# Patient Record
Sex: FEMALE | Race: BLACK OR AFRICAN AMERICAN
[De-identification: names, ages, dates, MRNs, and addresses within clinical notes are randomized per-mention and may not be internally consistent; named-entity substitution may affect disease eponyms.]

---

## 2017-02-18 ENCOUNTER — HOSPITAL ENCOUNTER (EMERGENCY)
Dept: HOSPITAL 10 - E/R | Age: 32
Discharge: HOME | End: 2017-02-18
Payer: SELF-PAY

## 2017-02-18 VITALS — HEART RATE: 81 BPM | DIASTOLIC BLOOD PRESSURE: 64 MMHG | RESPIRATION RATE: 20 BRPM | SYSTOLIC BLOOD PRESSURE: 119 MMHG

## 2017-02-18 VITALS
HEIGHT: 71 IN | BODY MASS INDEX: 24.23 KG/M2 | WEIGHT: 173.06 LBS | BODY MASS INDEX: 24.23 KG/M2 | HEIGHT: 71 IN | WEIGHT: 173.06 LBS

## 2017-02-18 DIAGNOSIS — D57.00: Primary | ICD-10-CM

## 2017-02-18 LAB — URINE BLOOD (DIP) POC: (no result)

## 2017-02-18 PROCEDURE — 99283 EMERGENCY DEPT VISIT LOW MDM: CPT

## 2017-02-18 PROCEDURE — 81003 URINALYSIS AUTO W/O SCOPE: CPT

## 2017-02-18 NOTE — ERD
ER Documentation


Chief Complaint


Date/Time


DATE: 2/18/17 


TIME: 22:20


Chief Complaint


Sickle cell disease. Low back pain. No CP.





HPI


Patient is a 31-year-old female with sickle cell disease who presents with a 

sickle cell pain crisis.  The patient says that she has had lower back pain for 

the past 3 hours.  She has had no treatment as of yet.  She has no chest pain 

or fevers.  These pains are consistent with her previous sickle cell pain.  

Upon review of old medical records this is the patient's seventh visit to the 

ER since 2015.  Review of the emergency department information exchange shows 

visits to our emergency department only.  She does not currently have a primary 

doctor.





ROS


All systems reviewed and are negative except as per history of present illness.





Medications


Home Meds


Active Scripts


Hydrocodone/Acetaminophen (Norco  Tablet) 1 Each Tablet, 1 TAB PO Q6H Y 

for PAIN, #7 TAB


   Prov:JAILENE ALEX MD         2/18/17


Ondansetron Hcl* (Zofran* ODT) 4 mg -ODT Tab.disper, 4 MG PO Q6 Y for NAUSEA AND

/OR VOMITING, #30 TAB


   Prov:JAILENE ALEX MD         2/24/16


Hydrocodone Bit-Acetaminophen* (Norco*)  Mg Tablet, 1 TAB PO Q6 Y for PAIN

, #12 TAB


   Prov:JAILENE ALEX MD         2/24/16





Allergies


Allergies:  


Coded Allergies:  


     morphine (Verified  Allergy, Unknown, 2/19/16)





PMhx/Soc


Medical and Surgical Hx:  pt denies Surgical Hx


History of Surgery:  No


Anesthesia Reaction:  No


Hx Neurological Disorder:  No


Hx Respiratory Disorders:  No


Hx Cardiac Disorders:  No


Hx Psychiatric Problems:  No


Hx Miscellaneous Medical Probl:  Yes (SICKLE CELL)


Hx Alcohol Use:  No


Hx Substance Use:  No


Hx Tobacco Use:  No


Smoking Status:  Unknown if ever smoked





FmHx


Family History:  No diabetes





Physical Exam


Vitals





Vital Signs








  Date Time  Temp Pulse Resp B/P Pulse Ox O2 Delivery O2 Flow Rate FiO2


 


2/18/17 21:09  81 20 119/64 98 Room Air  


 


2/18/17 19:56 98.8 89 22 135/89 98   








Physical Exam


Const: Mild distress secondary to pain


Head:   Atraumatic 


Eyes:    Normal Conjunctiva


ENT:    Normal External Ears, Nose and Mouth.


Neck:               Full range of motion..~ No meningismus.


Resp:    Clear to auscultation bilaterally


Cardio:    Regular rate and rhythm, no murmurs


Abd:    Soft, non tender, non distended. Normal bowel sounds


Skin:    No petechiae or rashes


Back:   Lower back pain with palpation bilaterally


Ext:    No cyanosis, or edema


Neur:    Awake and alert


Psych:    Normal Mood and Affect


Results 24 hrs





 Laboratory Tests








Test


  2/18/17


20:46


 


Bedside Urine Blood 2+ 


 


Bedside Urine Glucose (UA) Negative 


 


Bedside Urine Ketones (LAB) Negative 


 


Bedside Urine Leukocyte


Esterase (L Negative 


 


 


Bedside Urine Nitrite (LAB) Negative 


 


Bedside Urine Protein (LAB) Negative 


 


Bedside Urine pH (LAB) 6.0 








 Current Medications








 Medications


  (Trade)  Dose


 Ordered  Sig/King


 Route


 PRN Reason  Start Time


 Stop Time Status Last Admin


Dose Admin


 


 Acetaminophen/


 Hydrocodone Bitart


  (Norco (10/325))  1 tab  ONCE  ONCE


 PO


   2/18/17 20:30


 2/18/17 20:31 DC 2/18/17 20:39


 


 


 Ondansetron HCl


  (Zofran Odt)  4 mg  ONCE  STAT


 ODT


   2/18/17 20:28


 2/18/17 20:29 DC 2/18/17 20:50


 











Procedures/MDM


Patient is a 31-year-old female presents with acute sickle cell pain crisis.  

She has back pain.  Her pregnancy test is negative.  At this point I doubt 

cauda equina syndrome, epidural abscess, or epidural hematoma.  I believe 

outpatient management is appropriate.  She was given Norco and Zofran.  She 

will be given a short course of Reseda but she will need to follow-up with a 

pain management doctor as well.  I will also give her information for the local 

clinics.  I will give her information for Dr. Doyle for pain management.  She 

can return sooner for any worsening symptoms.





Departure


Diagnosis:  


 Primary Impression:  


 Sickle cell pain crisis


Condition:  Fair


Patient Instructions:  Sickle Cell Pain Crisis


Referrals:  


KRISTAN DOYLE








[facility]


YOU HAVE RECEIVED A MEDICAL SCREENING EXAM AND THE RESULTS INDICATE THAT YOU DO 

NOT HAVE A CONDITION THAT REQUIRES URGENT TREATMENT IN THE EMERGENCY DEPARTMENT.





FURTHER EVALUATION AND TREATMENT OF YOUR CONDITION CAN WAIT UNTIL YOU ARE SEEN 

IN YOUR DOCTORS OFFICE WITHIN THE NEXT 1-2 DAYS. IT IS YOUR RESPONSIBILITY TO 

MAKE AN APPOINTMENT FOR FOLOW-UP CARE.





IF YOU HAVE A PRIMARY DOCTOR


--you should call your primary doctor and schedule an appointment





IF YOU DO NOT HAVE A PRIMARY DOCTOR YOU CAN CALL OUR PHYSICIAN REFERRAL HOTLINE 

AT


 [phone] 





IF YOU CAN NOT AFFORD TO SEE A PHYSICIAN YOU CAN CHOSE FROM THE FOLLOWING 

[facility]





[facility] [phone] [address] [facility] [phone] [address] [facility] [phone] [address] [facility] [phone] [address] [facility] [phone] [address] [facility]. [facility]. [phone] [address]





Additional Instructions:  


SPECIALIST:  YOU HAVE A MEDICAL CONDITION WHICH REQUIRES YOU TO SEE A   

SPECIALIST WITHIN THE NEXT 1-2 DAYS. PLEASE FOLLOW UP WITH YOUR PRIMARY 

PHYSICIAN FOR REFFERAL.IF YOU DO NOT HAVE A PRIMARY CARE PHYSICIAN AND/OR YOU 

CAN NOT AFFORD TO SEE A PHYSICIAN THE FOLLOWING RESOURCES HAVE BEEN SUPPLIED TO 

YOU. IT IS YOUR RESPONSIBILITY TO BE SEEN BY THE SPECIALIST











JAILENE ALEX MD Feb 18, 2017 22:21

## 2018-02-02 ENCOUNTER — HOSPITAL ENCOUNTER (EMERGENCY)
Dept: HOSPITAL 91 - FTE | Age: 33
Discharge: HOME | End: 2018-02-02
Payer: SELF-PAY

## 2018-02-02 ENCOUNTER — HOSPITAL ENCOUNTER (EMERGENCY)
Age: 33
Discharge: HOME | End: 2018-02-02

## 2018-02-02 DIAGNOSIS — H81.10: Primary | ICD-10-CM

## 2018-02-02 LAB
ADD MAN DIFF?: NO
ALANINE AMINOTRANSFERASE: 35 IU/L (ref 13–69)
ALBUMIN/GLOBULIN RATIO: 1.27
ALBUMIN: 4.6 G/DL (ref 3.3–4.9)
ALKALINE PHOSPHATASE: 48 IU/L (ref 42–121)
ANION GAP: 14 (ref 8–16)
ASPARTATE AMINO TRANSFERASE: 29 IU/L (ref 15–46)
BASOPHIL #: 0.2 10^3/UL (ref 0–0.1)
BASOPHILS %: 2.2 % (ref 0–2)
BILIRUBIN,DIRECT: 0 MG/DL (ref 0–0.2)
BILIRUBIN,TOTAL: 1.1 MG/DL (ref 0.2–1.3)
BLOOD UREA NITROGEN: 8 MG/DL (ref 7–20)
CALCIUM: 9.5 MG/DL (ref 8.4–10.2)
CARBON DIOXIDE: 27 MMOL/L (ref 21–31)
CHLORIDE: 104 MMOL/L (ref 97–110)
CREATININE: 0.81 MG/DL (ref 0.44–1)
EOSINOPHILS #: 0.6 10^3/UL (ref 0–0.5)
EOSINOPHILS %: 7 % (ref 0–7)
GLOBULIN: 3.6 G/DL (ref 1.3–3.2)
GLUCOSE: 91 MG/DL (ref 70–220)
HEMATOCRIT: 28.6 % (ref 37–47)
HEMOGLOBIN: 10.6 G/DL (ref 12–16)
LYMPHOCYTES #: 2.3 10^3/UL (ref 0.8–2.9)
LYMPHOCYTES %: 26 % (ref 15–51)
MEAN CORPUSCULAR HEMOGLOBIN: 29.3 PG (ref 29–33)
MEAN CORPUSCULAR HGB CONC: 37.1 G/DL (ref 32–37)
MEAN CORPUSCULAR VOLUME: 79 FL (ref 82–101)
MEAN PLATELET VOLUME: 12.8 FL (ref 7.4–10.4)
MONOCYTE #: 0.8 10^3/UL (ref 0.3–0.9)
MONOCYTES %: 8.9 % (ref 0–11)
NEUTROPHIL #: 5 10^3/UL (ref 1.6–7.5)
NEUTROPHILS %: 55.7 % (ref 39–77)
NUCLEATED RED BLOOD CELLS #: 0.1 10^3/UL (ref 0–0)
NUCLEATED RED BLOOD CELLS%: 1.3 /100WBC (ref 0–0)
PLATELET COUNT: 258 10^3/UL (ref 140–415)
POTASSIUM: 4.2 MMOL/L (ref 3.5–5.1)
RED BLOOD COUNT: 3.62 10^6/UL (ref 4.2–5.4)
RED CELL DISTRIBUTION WIDTH: 13.8 % (ref 11.5–14.5)
SODIUM: 141 MMOL/L (ref 135–144)
TOTAL PROTEIN: 8.2 G/DL (ref 6.1–8.1)
URINE PH (DIP) POC: 7 (ref 5–8.5)
WHITE BLOOD COUNT: 9 10^3/UL (ref 4.8–10.8)

## 2018-02-02 PROCEDURE — 36415 COLL VENOUS BLD VENIPUNCTURE: CPT

## 2018-02-02 PROCEDURE — 81003 URINALYSIS AUTO W/O SCOPE: CPT

## 2018-02-02 PROCEDURE — 93005 ELECTROCARDIOGRAM TRACING: CPT

## 2018-02-02 PROCEDURE — 85025 COMPLETE CBC W/AUTO DIFF WBC: CPT

## 2018-02-02 PROCEDURE — 99284 EMERGENCY DEPT VISIT MOD MDM: CPT

## 2018-02-02 PROCEDURE — 80053 COMPREHEN METABOLIC PANEL: CPT

## 2018-02-02 RX ADMIN — ONDANSETRON 1 MG: 4 TABLET, ORALLY DISINTEGRATING ORAL at 12:02

## 2018-02-02 RX ADMIN — MECLIZINE 1 MG: 12.5 TABLET ORAL at 12:02
